# Patient Record
(demographics unavailable — no encounter records)

---

## 2025-03-22 NOTE — ASSESSMENT
[FreeTextEntry1] : Labs wants to wait till after [facility] ED check labs Lt Shoulder pain - recc Xray Consider Rx mgmt Consider Vascular eval RLE All questions answered Re check 3 months

## 2025-03-22 NOTE — PHYSICAL EXAM
[No Acute Distress] : no acute distress [Normal Sclera/Conjunctiva] : normal sclera/conjunctiva [EOMI] : extraocular movements intact [Normal Outer Ear/Nose] : the outer ears and nose were normal in appearance [Normal Oropharynx] : the oropharynx was normal [No JVD] : no jugular venous distention [No Lymphadenopathy] : no lymphadenopathy [No Respiratory Distress] : no respiratory distress  [Clear to Auscultation] : lungs were clear to auscultation bilaterally [Normal Rate] : normal rate  [Regular Rhythm] : with a regular rhythm [No Edema] : there was no peripheral edema [Soft] : abdomen soft [Non Tender] : non-tender [Normal Bowel Sounds] : normal bowel sounds [No CVA Tenderness] : no CVA  tenderness [No Joint Swelling] : no joint swelling [No Rash] : no rash [Normal Gait] : normal gait [de-identified] : DIEUDONNE no masses hernias Heme neg stool

## 2025-03-22 NOTE — HISTORY OF PRESENT ILLNESS
[FreeTextEntry1] : Initial visit - wants labs w/ vit D Also notes veins on RLE - seen previously and had surgical repair in 2013 Last year seen at [facility], [place], operated by [facility] veins and had injection and notes pressure which has resolved  Few concerns - ED awakens w/ flaccidity and has concerns  Wants testosterone checked Lt shoulder pain - began 2 months ago and similar issiue w/ Rt shoulder 2013 lasted 5 years and resolved spontaneously Notes abs obesity No additional complaints [de-identified] : Initial Visit - varicose veins [TextEntry] : PMhx - None/ had asthma sx in the past - no inhalers for years Rx - None Psurghx - Veins LLE, Rhinoplasty FamHx Mother 70 HTN father 72 HTN Sochx - Former smoker x 10 years 1 PPD quit 2012,  2 children good health Rx Allergy - ? name/ rash Has had COVID 2021

## 2025-03-22 NOTE — REVIEW OF SYSTEMS
[Joint Pain] : joint pain [Fever] : no fever [Chills] : no chills [Fatigue] : no fatigue [Discharge] : no discharge [Pain] : no pain [Itching] : no itching [Earache] : no earache [Hearing Loss] : no hearing loss [Sore Throat] : no sore throat [Chest Pain] : no chest pain [Palpitations] : no palpitations [Claudication] : no  leg claudication [Shortness Of Breath] : no shortness of breath [Wheezing] : no wheezing [Cough] : no cough [Abdominal Pain] : no abdominal pain [Nausea] : no nausea [Constipation] : no constipation [Heartburn] : no heartburn [Dysuria] : no dysuria [Incontinence] : no incontinence [Frequency] : no frequency [Skin Rash] : no skin rash [Headache] : no headache [Dizziness] : no dizziness [FreeTextEntry8] : Able to have and maintain an erection [FreeTextEntry9] : Lt shoulder